# Patient Record
Sex: FEMALE | Race: WHITE | URBAN - NONMETROPOLITAN AREA
[De-identification: names, ages, dates, MRNs, and addresses within clinical notes are randomized per-mention and may not be internally consistent; named-entity substitution may affect disease eponyms.]

---

## 2020-08-10 ENCOUNTER — OFFICE VISIT (OUTPATIENT)
Dept: URBAN - NONMETROPOLITAN AREA CLINIC 2 | Facility: CLINIC | Age: 50
End: 2020-08-10

## 2020-08-10 ENCOUNTER — LAB OUTSIDE AN ENCOUNTER (OUTPATIENT)
Dept: URBAN - NONMETROPOLITAN AREA CLINIC 2 | Facility: CLINIC | Age: 50
End: 2020-08-10

## 2020-08-10 ENCOUNTER — WEB ENCOUNTER (OUTPATIENT)
Dept: URBAN - NONMETROPOLITAN AREA CLINIC 2 | Facility: CLINIC | Age: 50
End: 2020-08-10

## 2020-08-10 ENCOUNTER — OFFICE VISIT (OUTPATIENT)
Dept: URBAN - NONMETROPOLITAN AREA CLINIC 2 | Facility: CLINIC | Age: 50
End: 2020-08-10
Payer: COMMERCIAL

## 2020-08-10 DIAGNOSIS — K59.1 FUNCTIONAL DIARRHEA: ICD-10-CM

## 2020-08-10 DIAGNOSIS — R19.7 ACUTE DIARRHEA: ICD-10-CM

## 2020-08-10 DIAGNOSIS — K21.9 GASTROESOPHAGEAL REFLUX DISEASE, ESOPHAGITIS PRESENCE NOT SPECIFIED: ICD-10-CM

## 2020-08-10 PROCEDURE — 87329 GIARDIA AG IA: CPT | Performed by: NURSE PRACTITIONER

## 2020-08-10 PROCEDURE — 99244 OFF/OP CNSLTJ NEW/EST MOD 40: CPT | Performed by: NURSE PRACTITIONER

## 2020-08-10 PROCEDURE — 99204 OFFICE O/P NEW MOD 45 MIN: CPT | Performed by: NURSE PRACTITIONER

## 2020-08-10 RX ORDER — DICYCLOMINE HYDROCHLORIDE 10 MG/1
TAKE 1 CAPSULE BY ORAL ROUTE 2 TIMES A DAY  BEFORE MEALS AS NEEDED FOR DIARRHEA/URGENCY CAPSULE ORAL 2
Qty: 60 CAPSULE | Refills: 6 | OUTPATIENT
Start: 2020-08-10 | End: 2021-03-07

## 2020-08-10 NOTE — HPI-TODAY'S VISIT:
Ina is a 51 YO who presents for evaluation of post prandial reflux, nausea, and diarrhea. Her symptoms have been flaring for over 3 months. She is on omeprazole BID but is unsure of the dose. SHe has never had an EGD. She is s/p CCY. She does not have her medications as she was up all night with her daughter being admitted with DKA. She states the diarrhea is post prandial. She can't identify any specific triggers but agrees her diet is poor. She is an asthmatic and a smoker. She is taking immodium for the diarrhea when she remembers. She is a  and otherwise not in good health. Today she  has multiple GI complaints. MB

## 2020-08-10 NOTE — PHYSICAL EXAM CHEST:
no lesions,  no deformities,  no traumatic injuries,  no significant scars are present,  chest wall non-tender,  no masses present,  tactile fremitus is normal, Positive for bilateral wheezing.

## 2020-08-11 ENCOUNTER — TELEPHONE ENCOUNTER (OUTPATIENT)
Dept: URBAN - METROPOLITAN AREA CLINIC 92 | Facility: CLINIC | Age: 50
End: 2020-08-11

## 2020-08-12 ENCOUNTER — LAB OUTSIDE AN ENCOUNTER (OUTPATIENT)
Dept: URBAN - METROPOLITAN AREA CLINIC 92 | Facility: CLINIC | Age: 50
End: 2020-08-12

## 2020-08-12 ENCOUNTER — TELEPHONE ENCOUNTER (OUTPATIENT)
Dept: URBAN - METROPOLITAN AREA CLINIC 92 | Facility: CLINIC | Age: 50
End: 2020-08-12

## 2020-08-12 LAB
A/G RATIO: 1.9
ALBUMIN: 4.4
ALKALINE PHOSPHATASE: 104
ALT (SGPT): 36
AST (SGOT): 41
BASO (ABSOLUTE): 0
BASOS: 0
BILIRUBIN, TOTAL: 0.2
BUN/CREATININE RATIO: 26
BUN: 11
C-REACTIVE PROTEIN, QUANT: 16
CALCIUM: 9
CARBON DIOXIDE, TOTAL: 23
CHLORIDE: 101
CREATININE: 0.42
EGFR IF AFRICN AM: 137
EGFR IF NONAFRICN AM: 119
ENDOMYSIAL ANTIBODY IGA: NEGATIVE
EOS (ABSOLUTE): 0.2
EOS: 3
GLOBULIN, TOTAL: 2.3
GLUCOSE: 118
HEMATOCRIT: 41.4
HEMATOLOGY COMMENTS:: (no result)
HEMOGLOBIN: 14.1
IMMATURE CELLS: (no result)
IMMATURE GRANS (ABS): 0
IMMATURE GRANULOCYTES: 0
IMMUNOGLOBULIN A, QN, SERUM: 143
LYMPHS (ABSOLUTE): 3.1
LYMPHS: 45
MCH: 32.4
MCHC: 34.1
MCV: 95
MONOCYTES(ABSOLUTE): 0.5
MONOCYTES: 7
NEUTROPHILS (ABSOLUTE): 3
NEUTROPHILS: 45
NRBC: (no result)
PLATELETS: 236
POTASSIUM: 4.1
PROTEIN, TOTAL: 6.7
RBC: 4.35
RDW: 12.7
SEDIMENTATION RATE-WESTERGREN: 30
SODIUM: 140
T-TRANSGLUTAMINASE (TTG) IGA: <2
TSH: 3.12
WBC: 6.7

## 2020-08-13 ENCOUNTER — OFFICE VISIT (OUTPATIENT)
Dept: URBAN - METROPOLITAN AREA MEDICAL CENTER 1 | Facility: MEDICAL CENTER | Age: 50
End: 2020-08-13
Payer: COMMERCIAL

## 2020-08-13 DIAGNOSIS — K29.30 CHRONIC SUPERFICIAL GASTRITIS: ICD-10-CM

## 2020-08-13 DIAGNOSIS — K22.8 COLUMNAR-LINED ESOPHAGUS: ICD-10-CM

## 2020-08-13 PROCEDURE — 43239 EGD BIOPSY SINGLE/MULTIPLE: CPT | Performed by: INTERNAL MEDICINE

## 2020-08-28 ENCOUNTER — TELEPHONE ENCOUNTER (OUTPATIENT)
Dept: URBAN - NONMETROPOLITAN AREA CLINIC 2 | Facility: CLINIC | Age: 50
End: 2020-08-28

## 2020-09-04 ENCOUNTER — OFFICE VISIT (OUTPATIENT)
Dept: URBAN - METROPOLITAN AREA TELEHEALTH 2 | Facility: TELEHEALTH | Age: 50
End: 2020-09-04
Payer: COMMERCIAL

## 2020-09-04 DIAGNOSIS — R74.8 ELEVATED LIVER ENZYMES: ICD-10-CM

## 2020-09-04 DIAGNOSIS — K21.9 GASTROESOPHAGEAL REFLUX DISEASE, ESOPHAGITIS PRESENCE NOT SPECIFIED: ICD-10-CM

## 2020-09-04 DIAGNOSIS — K59.1 FUNCTIONAL DIARRHEA: ICD-10-CM

## 2020-09-04 PROCEDURE — 99442 PHONE E/M BY PHYS 11-20 MIN: CPT | Performed by: INTERNAL MEDICINE

## 2020-09-04 RX ORDER — DICYCLOMINE HYDROCHLORIDE 10 MG/1
TAKE 1 CAPSULE BY ORAL ROUTE 2 TIMES A DAY  BEFORE MEALS AS NEEDED FOR DIARRHEA/URGENCY CAPSULE ORAL 2
Qty: 60 CAPSULE | Refills: 6 | Status: ACTIVE | COMMUNITY
Start: 2020-08-10 | End: 2021-03-07

## 2020-09-04 RX ORDER — DICYCLOMINE HYDROCHLORIDE 10 MG/1
TAKE 1 CAPSULE BY ORAL ROUTE 2 TIMES A DAY  BEFORE MEALS AS NEEDED FOR DIARRHEA/URGENCY CAPSULE ORAL THREE TIMES A DAY
Qty: 270 CAPSULES | Refills: 3
Start: 2020-08-10 | End: 2021-08-05

## 2020-09-04 RX ORDER — RIFAXIMIN 550 MG/1
1 TABLET TABLET ORAL THREE TIMES A DAY
Qty: 42 TABLET | Refills: 2 | OUTPATIENT
Start: 2020-09-04 | End: 2020-10-16

## 2020-09-04 NOTE — HPI-TODAY'S VISIT:
Ina is a 49 YO who presents for evaluation of post prandial reflux, nausea, and diarrhea. Her symptoms have been flaring for over 3 months. She is on omeprazole BID but is unsure of the dose. SHe has never had an EGD. She is s/p CCY. She does not have her medications as she was up all night with her daughter being admitted with DKA. She states the diarrhea is post prandial. She can't identify any specific triggers but agrees her diet is poor. She is an asthmatic and a smoker. She is taking immodium for the diarrhea when she remembers. She is a  and otherwise not in good health. Today she  has multiple GI complaints. MB  9/4/2020 Radha presents for follow-up of reflux, diarrhea, and elevated liver enzymes.  Since her last visit she is status post EGD with reflux esophagitis.  Her reflux is stable on omeprazole 40 mg twice daily.  She continues to smoke cigarettes daily.  She had no evidence of Aguilera's esophagus on EGD.  Her diarrhea has improved on dicyclomine twice daily.  She still has increased frequency and urgency which she feels is not her baseline, but this did improve her symptoms.  She does report some bloating.  We are still awaiting Dr. Mares last colonoscopy done a year ago.  1 of her liver enzymes was mildly elevated on labs, her repeat chronic serologies and ultrasound is pending.  Today she is doing well otherwise.  MB

## 2020-09-11 LAB
ACTIN (SMOOTH MUSCLE) ANTIBODY: 3
ALBUMIN: 4.6
ALKALINE PHOSPHATASE: 112
ALPHA-1-ANTITRYPSIN, SERUM: 147
ALT (SGPT): 40
ANA DIRECT: NEGATIVE
AST (SGOT): 40
BASO (ABSOLUTE): 0
BASOS: 0
BILIRUBIN, DIRECT: 0.14
BILIRUBIN, TOTAL: 0.5
BUN/CREATININE RATIO: 18
BUN: 9
CARBON DIOXIDE, TOTAL: 24
CHLORIDE: 98
CREATININE: 0.5
EGFR IF AFRICN AM: 130
EGFR IF NONAFRICN AM: 112
EOS (ABSOLUTE): 0.2
EOS: 2
FERRITIN, SERUM: 125
GLUCOSE: 97
HBSAG SCREEN: NEGATIVE
HEMATOCRIT: 42
HEMATOLOGY COMMENTS:: (no result)
HEMOGLOBIN: 15.1
HEP A AB, IGM: NEGATIVE
HEP A AB, TOTAL: NEGATIVE
HEP B CORE AB, IGM: NEGATIVE
HEP C VIRUS AB: 0.1
HEPATITIS B SURF AB QUANT: <3.1
IMMATURE CELLS: (no result)
IMMATURE GRANS (ABS): 0
IMMATURE GRANULOCYTES: 0
IRON BIND.CAP.(TIBC): 356
IRON SATURATION: 38
IRON: 137
LIVER-KIDNEY MICROSOMAL AB: 0.8
LYMPHS (ABSOLUTE): 3.2
LYMPHS: 34
MCH: 34
MCHC: 36
MCV: 95
MITOCHONDRIAL (M2) ANTIBODY: <20
MONOCYTES(ABSOLUTE): 0.6
MONOCYTES: 6
NEUTROPHILS (ABSOLUTE): 5.4
NEUTROPHILS: 58
NRBC: (no result)
PLATELETS: 246
POTASSIUM: 3.9
PROTEIN, TOTAL: 6.9
RBC: 4.44
RDW: 13.1
SODIUM: 138
UIBC: 219
WBC: 9.4

## 2020-09-24 ENCOUNTER — OFFICE VISIT (OUTPATIENT)
Dept: URBAN - NONMETROPOLITAN AREA CLINIC 2 | Facility: CLINIC | Age: 50
End: 2020-09-24

## 2020-12-03 ENCOUNTER — OFFICE VISIT (OUTPATIENT)
Dept: URBAN - METROPOLITAN AREA TELEHEALTH 2 | Facility: TELEHEALTH | Age: 50
End: 2020-12-03
Payer: COMMERCIAL

## 2020-12-03 DIAGNOSIS — R74.8 ELEVATED LIVER ENZYMES: ICD-10-CM

## 2020-12-03 DIAGNOSIS — K21.9 GASTROESOPHAGEAL REFLUX DISEASE, ESOPHAGITIS PRESENCE NOT SPECIFIED: ICD-10-CM

## 2020-12-03 DIAGNOSIS — Z12.11 COLON CANCER SCREENING: ICD-10-CM

## 2020-12-03 DIAGNOSIS — K59.1 FUNCTIONAL DIARRHEA: ICD-10-CM

## 2020-12-03 PROCEDURE — 99213 OFFICE O/P EST LOW 20 MIN: CPT | Performed by: INTERNAL MEDICINE

## 2020-12-03 PROCEDURE — 99443 PHONE E/M BY PHYS 21-30 MIN: CPT | Performed by: INTERNAL MEDICINE

## 2020-12-03 RX ORDER — DICYCLOMINE HYDROCHLORIDE 10 MG/1
TAKE 1 CAPSULE BY ORAL ROUTE 2 TIMES A DAY  BEFORE MEALS AS NEEDED FOR DIARRHEA/URGENCY CAPSULE ORAL THREE TIMES A DAY
Qty: 270 CAPSULES | Refills: 3 | Status: ACTIVE | COMMUNITY
Start: 2020-08-10 | End: 2021-08-05

## 2020-12-03 RX ORDER — OMEPRAZOLE 40 MG/1
1 CAPSULE 30 MINUTES BEFORE MORNING MEAL CAPSULE, DELAYED RELEASE ORAL BID
Qty: 180 CAPSULE | Refills: 3 | OUTPATIENT
Start: 2020-12-03

## 2020-12-03 RX ORDER — SUCRALFATE 1 G/1
1 PO BID BEFORE LUNCH AND BEDTIME NOT W/I 1 HOUR OF OTHER MEDS TABLET ORAL BID
Qty: 180 TABLET | Refills: 3 | OUTPATIENT
Start: 2020-12-03 | End: 2021-11-28

## 2020-12-03 NOTE — HPI-TODAY'S VISIT:
Ina is a 49 YO who presents for evaluation of post prandial reflux, nausea, and diarrhea. Her symptoms have been flaring for over 3 months. She is on omeprazole BID but is unsure of the dose. SHe has never had an EGD. She is s/p CCY. She does not have her medications as she was up all night with her daughter being admitted with DKA. She states the diarrhea is post prandial. She can't identify any specific triggers but agrees her diet is poor. She is an asthmatic and a smoker. She is taking immodium for the diarrhea when she remembers. She is a  and otherwise not in good health. Today she  has multiple GI complaints. MB  9/4/2020 Radha presents for follow-up of reflux, diarrhea, and elevated liver enzymes.  Since her last visit she is status post EGD with reflux esophagitis.  Her reflux is stable on omeprazole 40 mg twice daily.  She continues to smoke cigarettes daily.  She had no evidence of Aguilera's esophagus on EGD.  Her diarrhea has improved on dicyclomine twice daily.  She still has increased frequency and urgency which she feels is not her baseline, but this did improve her symptoms.  She does report some bloating.  We are still awaiting Dr. Mares last colonoscopy done a year ago.  1 of her liver enzymes was mildly elevated on labs, her repeat chronic serologies and ultrasound is pending.  Today she is doing well otherwise.  MB   12/3/2020 Radha presents for follow up of reflux, diarrhea, and elevated liver enzymes. Since her last visit her chronic serologies are negative for autoimmune or viral liver disease. Her US shows fatty liver with enlargement. Her reflux is doing better on omeprazole 40mg BID but she still has breakthrough. She has stopped smoking. She agrees her diet is poor. She did have a knee replacement this month and she is working on PT. Her bowels have improved on dicyclomine TID PRN for diarrhea. Today she is doing fairly well otherwise. MB

## 2021-06-02 ENCOUNTER — DASHBOARD ENCOUNTERS (OUTPATIENT)
Age: 51
End: 2021-06-02

## 2021-06-02 ENCOUNTER — OFFICE VISIT (OUTPATIENT)
Dept: URBAN - METROPOLITAN AREA TELEHEALTH 2 | Facility: TELEHEALTH | Age: 51
End: 2021-06-02
Payer: COMMERCIAL

## 2021-06-02 DIAGNOSIS — R19.7 ACUTE DIARRHEA: ICD-10-CM

## 2021-06-02 DIAGNOSIS — K21.9 GASTROESOPHAGEAL REFLUX DISEASE, ESOPHAGITIS PRESENCE NOT SPECIFIED: ICD-10-CM

## 2021-06-02 DIAGNOSIS — R74.8 ELEVATED LIVER ENZYMES: ICD-10-CM

## 2021-06-02 DIAGNOSIS — Z12.11 COLON CANCER SCREENING: ICD-10-CM

## 2021-06-02 PROBLEM — 235595009: Status: ACTIVE | Noted: 2020-08-10

## 2021-06-02 PROBLEM — 47812002: Status: ACTIVE | Noted: 2020-08-10

## 2021-06-02 PROBLEM — 275978004 COLON CANCER SCREENING: Status: ACTIVE | Noted: 2020-12-03

## 2021-06-02 PROBLEM — 707724006: Status: ACTIVE | Noted: 2020-08-12

## 2021-06-02 PROCEDURE — 99214 OFFICE O/P EST MOD 30 MIN: CPT | Performed by: INTERNAL MEDICINE

## 2021-06-02 RX ORDER — OMEPRAZOLE 40 MG/1
1 CAPSULE 30 MINUTES BEFORE MORNING MEAL CAPSULE, DELAYED RELEASE ORAL BID
Qty: 180 CAPSULE | Refills: 3 | Status: ACTIVE | COMMUNITY
Start: 2020-12-03

## 2021-06-02 RX ORDER — SUCRALFATE 1 G/1
1 PO BID BEFORE LUNCH AND BEDTIME NOT W/I 1 HOUR OF OTHER MEDS TABLET ORAL BID
Qty: 180 TABLET | Refills: 3 | Status: ACTIVE | COMMUNITY
Start: 2020-12-03 | End: 2021-11-28

## 2021-06-02 RX ORDER — DICYCLOMINE HYDROCHLORIDE 10 MG/1
TAKE 1 CAPSULE BY ORAL ROUTE 2 TIMES A DAY  BEFORE MEALS AS NEEDED FOR DIARRHEA/URGENCY CAPSULE ORAL THREE TIMES A DAY
Qty: 270 CAPSULES | Refills: 3 | Status: ACTIVE | COMMUNITY
Start: 2020-08-10 | End: 2021-08-05

## 2021-06-02 RX ORDER — OMEPRAZOLE 20 MG/1
1 CAPSULE 30 MINUTES BEFORE MORNING MEAL CAPSULE, DELAYED RELEASE ORAL BID
Qty: 180 CAPSULE | Refills: 3
Start: 2020-12-03

## 2021-06-02 NOTE — HPI-TODAY'S VISIT:
Ina is a 49 YO who presents for evaluation of post prandial reflux, nausea, and diarrhea. Her symptoms have been flaring for over 3 months. She is on omeprazole BID but is unsure of the dose. SHe has never had an EGD. She is s/p CCY. She does not have her medications as she was up all night with her daughter being admitted with DKA. She states the diarrhea is post prandial. She can't identify any specific triggers but agrees her diet is poor. She is an asthmatic and a smoker. She is taking immodium for the diarrhea when she remembers. She is a  and otherwise not in good health. Today she  has multiple GI complaints. MB  9/4/2020 Radha presents for follow-up of reflux, diarrhea, and elevated liver enzymes.  Since her last visit she is status post EGD with reflux esophagitis.  Her reflux is stable on omeprazole 40 mg twice daily.  She continues to smoke cigarettes daily.  She had no evidence of Aguilera's esophagus on EGD.  Her diarrhea has improved on dicyclomine twice daily.  She still has increased frequency and urgency which she feels is not her baseline, but this did improve her symptoms.  She does report some bloating.  We are still awaiting Dr. Mares last colonoscopy done a year ago.  1 of her liver enzymes was mildly elevated on labs, her repeat chronic serologies and ultrasound is pending.  Today she is doing well otherwise.  MB   12/3/2020 Radha presents for follow up of reflux, diarrhea, and elevated liver enzymes. Since her last visit her chronic serologies are negative for autoimmune or viral liver disease. Her US shows fatty liver with enlargement. Her reflux is doing better on omeprazole 40mg BID but she still has breakthrough. She has stopped smoking. She agrees her diet is poor. She did have a knee replacement this month and she is working on PT. Her bowels have improved on dicyclomine TID PRN for diarrhea. Today she is doing fairly well otherwise. MB   6/2/2021 Ina presents for follow up of reflux, gastritis, IBS and fatty liver. Since her last visit she has been doing well on omeprazole 40mg BID and carafate as needed. Her diarrhea has resolved and she is not taking anything regularly but does use dicyclomine prn. She does agree to try and wean her PPI. She has not lost any weight. She is due for repeat labs. Today she is doing well. MB

## 2021-06-03 ENCOUNTER — OFFICE VISIT (OUTPATIENT)
Dept: URBAN - METROPOLITAN AREA TELEHEALTH 2 | Facility: TELEHEALTH | Age: 51
End: 2021-06-03

## 2021-12-02 ENCOUNTER — OFFICE VISIT (OUTPATIENT)
Dept: URBAN - NONMETROPOLITAN AREA CLINIC 2 | Facility: CLINIC | Age: 51
End: 2021-12-02

## 2021-12-02 RX ORDER — OMEPRAZOLE 20 MG/1
1 CAPSULE 30 MINUTES BEFORE MORNING MEAL CAPSULE, DELAYED RELEASE ORAL BID
Qty: 180 CAPSULE | Refills: 3 | Status: ACTIVE | COMMUNITY
Start: 2020-12-03